# Patient Record
Sex: MALE | Race: WHITE | URBAN - METROPOLITAN AREA
[De-identification: names, ages, dates, MRNs, and addresses within clinical notes are randomized per-mention and may not be internally consistent; named-entity substitution may affect disease eponyms.]

---

## 2019-02-14 ENCOUNTER — OFFICE VISIT (OUTPATIENT)
Dept: URGENT CARE | Facility: URGENT CARE | Age: 34
End: 2019-02-14
Payer: MEDICAID

## 2019-02-14 VITALS
DIASTOLIC BLOOD PRESSURE: 90 MMHG | OXYGEN SATURATION: 100 % | HEART RATE: 95 BPM | BODY MASS INDEX: 25.3 KG/M2 | WEIGHT: 193 LBS | TEMPERATURE: 98 F | SYSTOLIC BLOOD PRESSURE: 144 MMHG

## 2019-02-14 DIAGNOSIS — S62.639A CLOSED FRACTURE OF TUFT OF DISTAL PHALANX OF FINGER: Primary | ICD-10-CM

## 2019-02-14 PROCEDURE — 99203 OFFICE O/P NEW LOW 30 MIN: CPT | Performed by: FAMILY MEDICINE

## 2019-02-14 RX ORDER — HYDROCODONE BITARTRATE AND ACETAMINOPHEN 5; 325 MG/1; MG/1
1 TABLET ORAL
Qty: 2 TABLET | Refills: 0 | Status: SHIPPED | OUTPATIENT
Start: 2019-02-14

## 2019-02-14 ASSESSMENT — PAIN SCALES - GENERAL: PAINLEVEL: SEVERE PAIN (7)

## 2019-02-15 NOTE — PROGRESS NOTES
Chief complaint: left middle finger    3 days ago was snowblowing and stuck the hand in the snowblower to unclog it. However then his finger got crushed in the machine.    Went to the hospital in [place] emergency room  Took xrays was told was broken   He was prescribed with vicodin in the ER    No known kidney or liver problems  No known medical problems  No diabetes    Social history: smoker  No alcohol  No drug use per patient     Patient does construction and     No thoughts of harming self or others  Feels safe      No Known Allergies    No past medical history on file.      Current Outpatient Medications on File Prior to Visit:  NO ACTIVE MEDICATIONS      No current facility-administered medications on file prior to visit.     Social History     Tobacco Use     Smoking status: Current Every Day Smoker     Types: Cigarettes     Smokeless tobacco: Never Used   Substance Use Topics     Alcohol use: Yes     Drug use: No       ROS:  review of systems negative except for noted above.   No thoughts of harming self or others.     OBJECTIVE:  /90   Pulse 95   Temp 98  F (36.7  C) (Oral)   Wt 87.5 kg (193 lb)   SpO2 100%   BMI 25.30 kg/m     General:   awake, alert, and cooperative.  NAD.   Head: Normocephalic, atraumatic.  Eyes: Conjunctiva clear,   Neuro: Alert and oriented - normal speech.  MS: Using extremities freely  Affected extremity neurovascularly intact, no cyanosis or edema, good pulses and capillary refill.   On the distal phalanx of the left middle finger there is swelling and ecchymosis  The entire nail is pushed up slightly by the subungal hematoma.  PSYCH:  Normal affect, normal speech  SKIN: no obvious rashes    ASSESSMENT:    ICD-10-CM    1. Closed fracture of tuft of distal phalanx of finger S62.639A HYDROcodone-acetaminophen (NORCO) 5-325 MG tablet     ORTHO  REFERRAL       PLAN:   For some reason we are unable to pull up the care everywhere and given that it was already  "past 9pm in urgent care, unable to get support to retrieve [facility] records.  He has a significant subungal hematoma. It is already past 48 hours since the injury and I discussed to him the option of at least a trial of trephination of the subungal hematoma   On discussion of risks vs benefits patient was very resistant. \"Give me more vicodin then I'll do it\"   I discussed that I will only prescribe 2 tonight. We do not have access to his records.  However he needs follow up with orthopedics tomorrow for re-evaluation and further evaluation  Alarm signs or symptoms discussed, if present recommend go to ER   No thoughts of harming self or others  Aware vicodin sedating and habit forming. Do not take with alcohol or other sedating meds  Sedating medications given. Aware not to drive or operate machinery while on these medications. Caution with .    reviewed- no other prescriptions seen except from 3 days ago    Elise Romeo M.D.         Elise Romeo MD        "

## 2019-02-15 NOTE — PATIENT INSTRUCTIONS
Acute Injury Clinic  [facility] in [place] now offers an Acute Injury Clinic for orthopedic injuries such as broken bones, strains, sprains or tears. You can walk right in! Providers who specialize in sports medicine will see you without an appointment.  Our Acute Injury Clinic gets you to the right expert, right off the bat. Unless your injury is life-threatening, you can come straight to the clinic instead of going to the emergency room and having to make a follow-up appointment with an orthopedic specialist. We have imaging, pharmacy and physical therapy services on site, too.   Acute injury care hours:  Monday-Thursday, 8 a.m. - 8 p.m.  Friday, 8 a.m. - 5 p.m.  Saturday, 8 a.m.- 2 p.m.  [address] [place] [address]  Conditions we treat  Sports concussions   Fractures   Shoulder, elbow, wrist, knee or foot injuries   Muscle strain   Tears or sprains to ligaments and tendons   Running injuries    Or Check your insurance [facility]  About [facility] [place] is located just off [address] [place] [address] near [facility].  [facility] [place] is your first stop after sustaining an acute injury. Our Orthopedic Urgent Care is available to patients from 8 AM - 8 PM daily. From sports injuries to accidents, the specialists at our OU will diagnose and treat your injury on the spot.  Aside from Orthopedic Urgent Care, the clinic in [place] offers programming tailored to various orthopedic specialties, including physical therapy, hand therapy and a sports performance space. [facility] also is home to the [place] [facility], as well as on-site care suites that cater to [facility] program, specifically total joint replacement.

## 2024-10-08 ENCOUNTER — TELEPHONE (OUTPATIENT)
Dept: BEHAVIORAL HEALTH | Facility: CLINIC | Age: 39
End: 2024-10-08
Payer: COMMERCIAL

## 2024-10-08 NOTE — TELEPHONE ENCOUNTER
"Pt is a(n) adult (16+ out of HS) Seeking as eval for Adult LEANN Assessment for primary substance use treatment (OP LEANN programs including Co-occurring).  Appointment scheduled by:  Patient.  (self-pay - complete Cost Estimate)  Caller name:  Tyshawn     Caller phone #: [phone]  Legal Guardianship Reviewed?  No  Honoring Choices Notified?  No  Brief reason for appt:  Pt interested in LEANN OP programming.      needed?  NO    Contact information verified/updated: Yes    If appt is for adult LEANN program location, confirm you have verified the location and address with the patient referring to the template header.  Yes    Ciara Varela    \"We have scheduled your evaluation. In the event that your insurance coverage comes back as out of network, you may receive a call to cancel your appointment and direct you to your insurance company for in-network coverage.\"    Disclaimer regarding insurance read to patient?  Yes  Informed patient [place] are for programming that is in person in the [place] area?  Yes - proceed with scheduling    "

## 2024-10-21 ENCOUNTER — DOCUMENTATION ONLY (OUTPATIENT)
Dept: ADDICTION MEDICINE | Facility: HOSPITAL | Age: 39
End: 2024-10-21
Payer: COMMERCIAL

## 2024-10-21 NOTE — PROGRESS NOTES
Data: Patient was no show to LEANN assessment  Intervention: Unable to complete the appointment     Elvia Camejo [facility] 10/21/24 9:30 am